# Patient Record
Sex: FEMALE | Race: WHITE | NOT HISPANIC OR LATINO | Employment: FULL TIME | URBAN - METROPOLITAN AREA
[De-identification: names, ages, dates, MRNs, and addresses within clinical notes are randomized per-mention and may not be internally consistent; named-entity substitution may affect disease eponyms.]

---

## 2019-12-05 ENCOUNTER — HOSPITAL ENCOUNTER (INPATIENT)
Facility: HOSPITAL | Age: 59
LOS: 3 days | Discharge: HOME | End: 2019-12-08
Attending: EMERGENCY MEDICINE | Admitting: SURGERY

## 2019-12-05 ENCOUNTER — APPOINTMENT (OUTPATIENT)
Dept: ULTRASOUND IMAGING | Facility: HOSPITAL | Age: 59
End: 2019-12-05

## 2019-12-05 DIAGNOSIS — K80.20 CALCULUS OF GALLBLADDER: ICD-10-CM

## 2019-12-05 DIAGNOSIS — K81.9 CHOLECYSTITIS: ICD-10-CM

## 2019-12-05 DIAGNOSIS — R10.9 ACUTE ABDOMINAL PAIN: ICD-10-CM

## 2019-12-05 DIAGNOSIS — K80.00 CALCULUS OF GALLBLADDER WITH ACUTE CHOLECYSTITIS WITHOUT OBSTRUCTION: Primary | ICD-10-CM

## 2019-12-05 DIAGNOSIS — D72.829 LEUKOCYTOSIS, UNSPECIFIED TYPE: ICD-10-CM

## 2019-12-05 LAB
ALBUMIN SERPL-MCNC: 4.3 G/DL (ref 3.5–5.2)
ALBUMIN/GLOB SERPL: 1 G/DL
ALP SERPL-CCNC: 91 U/L (ref 39–117)
ALT SERPL W P-5'-P-CCNC: 14 U/L (ref 1–33)
ANION GAP SERPL CALCULATED.3IONS-SCNC: 14 MMOL/L (ref 5–15)
AST SERPL-CCNC: 16 U/L (ref 1–32)
BACTERIA UR QL AUTO: NORMAL /HPF
BASOPHILS # BLD AUTO: 0.06 10*3/MM3 (ref 0–0.2)
BASOPHILS NFR BLD AUTO: 0.3 % (ref 0–1.5)
BILIRUB SERPL-MCNC: 0.4 MG/DL (ref 0.2–1.2)
BILIRUB UR QL STRIP: NEGATIVE
BUN BLD-MCNC: 12 MG/DL (ref 8–23)
BUN/CREAT SERPL: 15.2 (ref 7–25)
CALCIUM SPEC-SCNC: 9.7 MG/DL (ref 8.6–10.5)
CHLORIDE SERPL-SCNC: 95 MMOL/L (ref 98–107)
CLARITY UR: CLEAR
CO2 SERPL-SCNC: 29 MMOL/L (ref 22–29)
COLOR UR: YELLOW
CREAT BLD-MCNC: 0.79 MG/DL (ref 0.57–1)
D-LACTATE SERPL-SCNC: 1.2 MMOL/L (ref 0.5–2)
DEPRECATED RDW RBC AUTO: 40.5 FL (ref 37–54)
EOSINOPHIL # BLD AUTO: 0.04 10*3/MM3 (ref 0–0.4)
EOSINOPHIL NFR BLD AUTO: 0.2 % (ref 0.3–6.2)
ERYTHROCYTE [DISTWIDTH] IN BLOOD BY AUTOMATED COUNT: 12.5 % (ref 12.3–15.4)
GFR SERPL CREATININE-BSD FRML MDRD: 74 ML/MIN/1.73
GLOBULIN UR ELPH-MCNC: 4.1 GM/DL
GLUCOSE BLD-MCNC: 131 MG/DL (ref 65–99)
GLUCOSE UR STRIP-MCNC: NEGATIVE MG/DL
HCT VFR BLD AUTO: 45.1 % (ref 34–46.6)
HGB BLD-MCNC: 14.7 G/DL (ref 12–15.9)
HGB UR QL STRIP.AUTO: NEGATIVE
HYALINE CASTS UR QL AUTO: NORMAL /LPF
IMM GRANULOCYTES # BLD AUTO: 0.06 10*3/MM3 (ref 0–0.05)
IMM GRANULOCYTES NFR BLD AUTO: 0.3 % (ref 0–0.5)
KETONES UR QL STRIP: NEGATIVE
LEUKOCYTE ESTERASE UR QL STRIP.AUTO: NEGATIVE
LIPASE SERPL-CCNC: 170 U/L (ref 13–60)
LYMPHOCYTES # BLD AUTO: 3.49 10*3/MM3 (ref 0.7–3.1)
LYMPHOCYTES NFR BLD AUTO: 20 % (ref 19.6–45.3)
MCH RBC QN AUTO: 28.9 PG (ref 26.6–33)
MCHC RBC AUTO-ENTMCNC: 32.6 G/DL (ref 31.5–35.7)
MCV RBC AUTO: 88.6 FL (ref 79–97)
MONOCYTES # BLD AUTO: 1.39 10*3/MM3 (ref 0.1–0.9)
MONOCYTES NFR BLD AUTO: 8 % (ref 5–12)
NEUTROPHILS # BLD AUTO: 12.43 10*3/MM3 (ref 1.7–7)
NEUTROPHILS NFR BLD AUTO: 71.2 % (ref 42.7–76)
NITRITE UR QL STRIP: NEGATIVE
NRBC BLD AUTO-RTO: 0 /100 WBC (ref 0–0.2)
PH UR STRIP.AUTO: 6.5 [PH] (ref 5–8)
PLATELET # BLD AUTO: 408 10*3/MM3 (ref 140–450)
PMV BLD AUTO: 10.4 FL (ref 6–12)
POTASSIUM BLD-SCNC: 3.5 MMOL/L (ref 3.5–5.2)
PROCALCITONIN SERPL-MCNC: 0.1 NG/ML (ref 0.1–0.25)
PROT SERPL-MCNC: 8.4 G/DL (ref 6–8.5)
PROT UR QL STRIP: ABNORMAL
RBC # BLD AUTO: 5.09 10*6/MM3 (ref 3.77–5.28)
RBC # UR: NORMAL /HPF
REF LAB TEST METHOD: NORMAL
SODIUM BLD-SCNC: 138 MMOL/L (ref 136–145)
SP GR UR STRIP: 1.04 (ref 1–1.03)
SQUAMOUS #/AREA URNS HPF: NORMAL /HPF
TROPONIN T SERPL-MCNC: <0.01 NG/ML (ref 0–0.03)
UROBILINOGEN UR QL STRIP: ABNORMAL
WBC NRBC COR # BLD: 17.47 10*3/MM3 (ref 3.4–10.8)
WBC UR QL AUTO: NORMAL /HPF

## 2019-12-05 PROCEDURE — 25010000002 PIPERACILLIN SOD-TAZOBACTAM PER 1 G: Performed by: NURSE PRACTITIONER

## 2019-12-05 PROCEDURE — 76705 ECHO EXAM OF ABDOMEN: CPT

## 2019-12-05 PROCEDURE — 99284 EMERGENCY DEPT VISIT MOD MDM: CPT

## 2019-12-05 PROCEDURE — 25010000002 HYDROMORPHONE PER 4 MG: Performed by: EMERGENCY MEDICINE

## 2019-12-05 PROCEDURE — 86901 BLOOD TYPING SEROLOGIC RH(D): CPT

## 2019-12-05 PROCEDURE — 93005 ELECTROCARDIOGRAM TRACING: CPT | Performed by: EMERGENCY MEDICINE

## 2019-12-05 PROCEDURE — 84145 PROCALCITONIN (PCT): CPT | Performed by: FAMILY MEDICINE

## 2019-12-05 PROCEDURE — 83605 ASSAY OF LACTIC ACID: CPT | Performed by: EMERGENCY MEDICINE

## 2019-12-05 PROCEDURE — 86900 BLOOD TYPING SEROLOGIC ABO: CPT

## 2019-12-05 PROCEDURE — 84484 ASSAY OF TROPONIN QUANT: CPT | Performed by: EMERGENCY MEDICINE

## 2019-12-05 PROCEDURE — 87040 BLOOD CULTURE FOR BACTERIA: CPT | Performed by: EMERGENCY MEDICINE

## 2019-12-05 PROCEDURE — 83690 ASSAY OF LIPASE: CPT | Performed by: EMERGENCY MEDICINE

## 2019-12-05 PROCEDURE — 85025 COMPLETE CBC W/AUTO DIFF WBC: CPT | Performed by: EMERGENCY MEDICINE

## 2019-12-05 PROCEDURE — 25010000002 ONDANSETRON PER 1 MG: Performed by: EMERGENCY MEDICINE

## 2019-12-05 PROCEDURE — 80053 COMPREHEN METABOLIC PANEL: CPT | Performed by: EMERGENCY MEDICINE

## 2019-12-05 PROCEDURE — 81001 URINALYSIS AUTO W/SCOPE: CPT | Performed by: EMERGENCY MEDICINE

## 2019-12-05 RX ORDER — HYDROMORPHONE HYDROCHLORIDE 1 MG/ML
0.5 INJECTION, SOLUTION INTRAMUSCULAR; INTRAVENOUS; SUBCUTANEOUS ONCE
Status: COMPLETED | OUTPATIENT
Start: 2019-12-05 | End: 2019-12-05

## 2019-12-05 RX ORDER — LISINOPRIL 10 MG/1
10 TABLET ORAL DAILY PRN
COMMUNITY

## 2019-12-05 RX ORDER — ONDANSETRON 2 MG/ML
4 INJECTION INTRAMUSCULAR; INTRAVENOUS ONCE
Status: COMPLETED | OUTPATIENT
Start: 2019-12-05 | End: 2019-12-05

## 2019-12-05 RX ORDER — HYDROCHLOROTHIAZIDE 12.5 MG/1
12.5 TABLET ORAL DAILY PRN
COMMUNITY

## 2019-12-05 RX ORDER — SODIUM CHLORIDE 0.9 % (FLUSH) 0.9 %
10 SYRINGE (ML) INJECTION AS NEEDED
Status: DISCONTINUED | OUTPATIENT
Start: 2019-12-05 | End: 2019-12-08 | Stop reason: HOSPADM

## 2019-12-05 RX ADMIN — TAZOBACTAM SODIUM AND PIPERACILLIN SODIUM 3.38 G: 375; 3 INJECTION, SOLUTION INTRAVENOUS at 21:38

## 2019-12-05 RX ADMIN — ONDANSETRON 4 MG: 2 INJECTION INTRAMUSCULAR; INTRAVENOUS at 21:23

## 2019-12-05 RX ADMIN — HYDROMORPHONE HYDROCHLORIDE 0.5 MG: 1 INJECTION, SOLUTION INTRAMUSCULAR; INTRAVENOUS; SUBCUTANEOUS at 21:24

## 2019-12-05 RX ADMIN — SODIUM CHLORIDE 1000 ML: 9 INJECTION, SOLUTION INTRAVENOUS at 21:23

## 2019-12-06 ENCOUNTER — APPOINTMENT (OUTPATIENT)
Dept: GENERAL RADIOLOGY | Facility: HOSPITAL | Age: 59
End: 2019-12-06

## 2019-12-06 ENCOUNTER — ANESTHESIA (OUTPATIENT)
Dept: PERIOP | Facility: HOSPITAL | Age: 59
End: 2019-12-06

## 2019-12-06 ENCOUNTER — APPOINTMENT (OUTPATIENT)
Dept: CT IMAGING | Facility: HOSPITAL | Age: 59
End: 2019-12-06

## 2019-12-06 ENCOUNTER — ANESTHESIA EVENT (OUTPATIENT)
Dept: PERIOP | Facility: HOSPITAL | Age: 59
End: 2019-12-06

## 2019-12-06 PROBLEM — K85.90 PANCREATITIS: Status: ACTIVE | Noted: 2019-12-06

## 2019-12-06 PROBLEM — I10 HTN (HYPERTENSION): Status: ACTIVE | Noted: 2019-12-06

## 2019-12-06 PROBLEM — E27.8 ADRENAL NODULE (HCC): Status: ACTIVE | Noted: 2019-12-06

## 2019-12-06 PROBLEM — D72.829 LEUKOCYTOSIS: Status: ACTIVE | Noted: 2019-12-06

## 2019-12-06 LAB
ABO GROUP BLD: NORMAL
ABO GROUP BLD: NORMAL
ANION GAP SERPL CALCULATED.3IONS-SCNC: 11 MMOL/L (ref 5–15)
APTT PPP: 31.4 SECONDS (ref 24–37)
BASOPHILS # BLD AUTO: 0.04 10*3/MM3 (ref 0–0.2)
BASOPHILS NFR BLD AUTO: 0.3 % (ref 0–1.5)
BLD GP AB SCN SERPL QL: NEGATIVE
BUN BLD-MCNC: 11 MG/DL (ref 8–23)
BUN/CREAT SERPL: 16.4 (ref 7–25)
CALCIUM SPEC-SCNC: 8.6 MG/DL (ref 8.6–10.5)
CHLORIDE SERPL-SCNC: 97 MMOL/L (ref 98–107)
CO2 SERPL-SCNC: 29 MMOL/L (ref 22–29)
CREAT BLD-MCNC: 0.67 MG/DL (ref 0.57–1)
DEPRECATED RDW RBC AUTO: 42.1 FL (ref 37–54)
EOSINOPHIL # BLD AUTO: 0.13 10*3/MM3 (ref 0–0.4)
EOSINOPHIL NFR BLD AUTO: 1 % (ref 0.3–6.2)
ERYTHROCYTE [DISTWIDTH] IN BLOOD BY AUTOMATED COUNT: 12.8 % (ref 12.3–15.4)
GFR SERPL CREATININE-BSD FRML MDRD: 90 ML/MIN/1.73
GLUCOSE BLD-MCNC: 101 MG/DL (ref 65–99)
HBA1C MFR BLD: 5.6 % (ref 4.8–5.6)
HCT VFR BLD AUTO: 39.7 % (ref 34–46.6)
HGB BLD-MCNC: 12.5 G/DL (ref 12–15.9)
IMM GRANULOCYTES # BLD AUTO: 0.04 10*3/MM3 (ref 0–0.05)
IMM GRANULOCYTES NFR BLD AUTO: 0.3 % (ref 0–0.5)
INR PPP: 1.07 (ref 0.85–1.16)
LYMPHOCYTES # BLD AUTO: 2.72 10*3/MM3 (ref 0.7–3.1)
LYMPHOCYTES NFR BLD AUTO: 21.4 % (ref 19.6–45.3)
MAGNESIUM SERPL-MCNC: 1.9 MG/DL (ref 1.6–2.4)
MCH RBC QN AUTO: 28.1 PG (ref 26.6–33)
MCHC RBC AUTO-ENTMCNC: 31.5 G/DL (ref 31.5–35.7)
MCV RBC AUTO: 89.2 FL (ref 79–97)
MONOCYTES # BLD AUTO: 1.36 10*3/MM3 (ref 0.1–0.9)
MONOCYTES NFR BLD AUTO: 10.7 % (ref 5–12)
NEUTROPHILS # BLD AUTO: 8.42 10*3/MM3 (ref 1.7–7)
NEUTROPHILS NFR BLD AUTO: 66.3 % (ref 42.7–76)
NRBC BLD AUTO-RTO: 0 /100 WBC (ref 0–0.2)
PLATELET # BLD AUTO: 339 10*3/MM3 (ref 140–450)
PMV BLD AUTO: 11.3 FL (ref 6–12)
POTASSIUM BLD-SCNC: 3.1 MMOL/L (ref 3.5–5.2)
PROTHROMBIN TIME: 13.3 SECONDS (ref 11.2–14.3)
RBC # BLD AUTO: 4.45 10*6/MM3 (ref 3.77–5.28)
RH BLD: POSITIVE
RH BLD: POSITIVE
SODIUM BLD-SCNC: 137 MMOL/L (ref 136–145)
T&S EXPIRATION DATE: NORMAL
WBC NRBC COR # BLD: 12.71 10*3/MM3 (ref 3.4–10.8)

## 2019-12-06 PROCEDURE — 0FT44ZZ RESECTION OF GALLBLADDER, PERCUTANEOUS ENDOSCOPIC APPROACH: ICD-10-PCS | Performed by: SURGERY

## 2019-12-06 PROCEDURE — 88304 TISSUE EXAM BY PATHOLOGIST: CPT | Performed by: SURGERY

## 2019-12-06 PROCEDURE — 86900 BLOOD TYPING SEROLOGIC ABO: CPT | Performed by: NURSE PRACTITIONER

## 2019-12-06 PROCEDURE — 25010000002 PROPOFOL 10 MG/ML EMULSION: Performed by: NURSE ANESTHETIST, CERTIFIED REGISTERED

## 2019-12-06 PROCEDURE — 85730 THROMBOPLASTIN TIME PARTIAL: CPT | Performed by: NURSE PRACTITIONER

## 2019-12-06 PROCEDURE — 83036 HEMOGLOBIN GLYCOSYLATED A1C: CPT | Performed by: NURSE PRACTITIONER

## 2019-12-06 PROCEDURE — 71045 X-RAY EXAM CHEST 1 VIEW: CPT

## 2019-12-06 PROCEDURE — 25010000003 LIDOCAINE 1 % SOLUTION: Performed by: NURSE ANESTHETIST, CERTIFIED REGISTERED

## 2019-12-06 PROCEDURE — 85610 PROTHROMBIN TIME: CPT | Performed by: NURSE PRACTITIONER

## 2019-12-06 PROCEDURE — 25010000002 ONDANSETRON PER 1 MG: Performed by: NURSE ANESTHETIST, CERTIFIED REGISTERED

## 2019-12-06 PROCEDURE — 74177 CT ABD & PELVIS W/CONTRAST: CPT

## 2019-12-06 PROCEDURE — 25010000002 FENTANYL CITRATE (PF) 100 MCG/2ML SOLUTION: Performed by: NURSE ANESTHETIST, CERTIFIED REGISTERED

## 2019-12-06 PROCEDURE — 25010000002 HYDROMORPHONE PER 4 MG: Performed by: NURSE PRACTITIONER

## 2019-12-06 PROCEDURE — 25010000002 DEXAMETHASONE PER 1 MG: Performed by: NURSE ANESTHETIST, CERTIFIED REGISTERED

## 2019-12-06 PROCEDURE — 85025 COMPLETE CBC W/AUTO DIFF WBC: CPT | Performed by: NURSE PRACTITIONER

## 2019-12-06 PROCEDURE — 25010000002 NEOSTIGMINE 10 MG/10ML SOLUTION: Performed by: NURSE ANESTHETIST, CERTIFIED REGISTERED

## 2019-12-06 PROCEDURE — 25010000002 PIPERACILLIN SOD-TAZOBACTAM PER 1 G: Performed by: NURSE PRACTITIONER

## 2019-12-06 PROCEDURE — 86850 RBC ANTIBODY SCREEN: CPT | Performed by: NURSE PRACTITIONER

## 2019-12-06 PROCEDURE — 86901 BLOOD TYPING SEROLOGIC RH(D): CPT | Performed by: NURSE PRACTITIONER

## 2019-12-06 PROCEDURE — 25010000002 IOPAMIDOL 61 % SOLUTION: Performed by: FAMILY MEDICINE

## 2019-12-06 PROCEDURE — 80048 BASIC METABOLIC PNL TOTAL CA: CPT | Performed by: NURSE PRACTITIONER

## 2019-12-06 PROCEDURE — 83735 ASSAY OF MAGNESIUM: CPT | Performed by: NURSE PRACTITIONER

## 2019-12-06 PROCEDURE — 25010000002 SUCCINYLCHOLINE PER 20 MG: Performed by: NURSE ANESTHETIST, CERTIFIED REGISTERED

## 2019-12-06 PROCEDURE — 99223 1ST HOSP IP/OBS HIGH 75: CPT | Performed by: FAMILY MEDICINE

## 2019-12-06 RX ORDER — SODIUM CHLORIDE 0.9 % (FLUSH) 0.9 %
10 SYRINGE (ML) INJECTION EVERY 12 HOURS SCHEDULED
Status: DISCONTINUED | OUTPATIENT
Start: 2019-12-06 | End: 2019-12-08 | Stop reason: HOSPADM

## 2019-12-06 RX ORDER — POTASSIUM CHLORIDE 7.45 MG/ML
10 INJECTION INTRAVENOUS
Status: DISCONTINUED | OUTPATIENT
Start: 2019-12-06 | End: 2019-12-08 | Stop reason: HOSPADM

## 2019-12-06 RX ORDER — SODIUM CHLORIDE 9 MG/ML
INJECTION, SOLUTION INTRAVENOUS AS NEEDED
Status: DISCONTINUED | OUTPATIENT
Start: 2019-12-06 | End: 2019-12-06 | Stop reason: HOSPADM

## 2019-12-06 RX ORDER — FENTANYL CITRATE 50 UG/ML
INJECTION, SOLUTION INTRAMUSCULAR; INTRAVENOUS AS NEEDED
Status: DISCONTINUED | OUTPATIENT
Start: 2019-12-06 | End: 2019-12-06 | Stop reason: SURG

## 2019-12-06 RX ORDER — SODIUM CHLORIDE 0.9 % (FLUSH) 0.9 %
10 SYRINGE (ML) INJECTION AS NEEDED
Status: DISCONTINUED | OUTPATIENT
Start: 2019-12-06 | End: 2019-12-08 | Stop reason: HOSPADM

## 2019-12-06 RX ORDER — ATRACURIUM BESYLATE 10 MG/ML
INJECTION, SOLUTION INTRAVENOUS AS NEEDED
Status: DISCONTINUED | OUTPATIENT
Start: 2019-12-06 | End: 2019-12-06 | Stop reason: SURG

## 2019-12-06 RX ORDER — SODIUM CHLORIDE 9 MG/ML
50 INJECTION, SOLUTION INTRAVENOUS CONTINUOUS
Status: DISCONTINUED | OUTPATIENT
Start: 2019-12-06 | End: 2019-12-08 | Stop reason: HOSPADM

## 2019-12-06 RX ORDER — SODIUM CHLORIDE, SODIUM LACTATE, POTASSIUM CHLORIDE, CALCIUM CHLORIDE 600; 310; 30; 20 MG/100ML; MG/100ML; MG/100ML; MG/100ML
9 INJECTION, SOLUTION INTRAVENOUS CONTINUOUS
Status: DISCONTINUED | OUTPATIENT
Start: 2019-12-06 | End: 2019-12-08 | Stop reason: HOSPADM

## 2019-12-06 RX ORDER — SODIUM CHLORIDE, SODIUM LACTATE, POTASSIUM CHLORIDE, CALCIUM CHLORIDE 600; 310; 30; 20 MG/100ML; MG/100ML; MG/100ML; MG/100ML
INJECTION, SOLUTION INTRAVENOUS CONTINUOUS PRN
Status: DISCONTINUED | OUTPATIENT
Start: 2019-12-06 | End: 2019-12-06 | Stop reason: SURG

## 2019-12-06 RX ORDER — NEOSTIGMINE METHYLSULFATE 1 MG/ML
INJECTION, SOLUTION INTRAVENOUS AS NEEDED
Status: DISCONTINUED | OUTPATIENT
Start: 2019-12-06 | End: 2019-12-06 | Stop reason: SURG

## 2019-12-06 RX ORDER — POTASSIUM CHLORIDE 1.5 G/1.77G
40 POWDER, FOR SOLUTION ORAL AS NEEDED
Status: DISCONTINUED | OUTPATIENT
Start: 2019-12-06 | End: 2019-12-08 | Stop reason: HOSPADM

## 2019-12-06 RX ORDER — ONDANSETRON 2 MG/ML
4 INJECTION INTRAMUSCULAR; INTRAVENOUS EVERY 6 HOURS PRN
Status: DISCONTINUED | OUTPATIENT
Start: 2019-12-06 | End: 2019-12-06

## 2019-12-06 RX ORDER — ONDANSETRON 2 MG/ML
INJECTION INTRAMUSCULAR; INTRAVENOUS AS NEEDED
Status: DISCONTINUED | OUTPATIENT
Start: 2019-12-06 | End: 2019-12-06

## 2019-12-06 RX ORDER — ONDANSETRON 2 MG/ML
4 INJECTION INTRAMUSCULAR; INTRAVENOUS ONCE AS NEEDED
Status: DISCONTINUED | OUTPATIENT
Start: 2019-12-06 | End: 2019-12-06 | Stop reason: HOSPADM

## 2019-12-06 RX ORDER — ONDANSETRON 4 MG/1
4 TABLET, FILM COATED ORAL EVERY 6 HOURS PRN
Status: DISCONTINUED | OUTPATIENT
Start: 2019-12-06 | End: 2019-12-08 | Stop reason: HOSPADM

## 2019-12-06 RX ORDER — BUPIVACAINE HYDROCHLORIDE AND EPINEPHRINE 5; 5 MG/ML; UG/ML
INJECTION, SOLUTION PERINEURAL AS NEEDED
Status: DISCONTINUED | OUTPATIENT
Start: 2019-12-06 | End: 2019-12-06 | Stop reason: HOSPADM

## 2019-12-06 RX ORDER — DEXAMETHASONE SODIUM PHOSPHATE 4 MG/ML
INJECTION, SOLUTION INTRA-ARTICULAR; INTRALESIONAL; INTRAMUSCULAR; INTRAVENOUS; SOFT TISSUE AS NEEDED
Status: DISCONTINUED | OUTPATIENT
Start: 2019-12-06 | End: 2019-12-06 | Stop reason: SURG

## 2019-12-06 RX ORDER — HYDROCODONE BITARTRATE AND ACETAMINOPHEN 5; 325 MG/1; MG/1
1 TABLET ORAL EVERY 6 HOURS PRN
Status: DISCONTINUED | OUTPATIENT
Start: 2019-12-06 | End: 2019-12-08 | Stop reason: HOSPADM

## 2019-12-06 RX ORDER — SODIUM CHLORIDE 9 MG/ML
100 INJECTION, SOLUTION INTRAVENOUS CONTINUOUS
Status: DISCONTINUED | OUTPATIENT
Start: 2019-12-06 | End: 2019-12-06

## 2019-12-06 RX ORDER — POTASSIUM CHLORIDE 750 MG/1
40 CAPSULE, EXTENDED RELEASE ORAL AS NEEDED
Status: DISCONTINUED | OUTPATIENT
Start: 2019-12-06 | End: 2019-12-08 | Stop reason: HOSPADM

## 2019-12-06 RX ORDER — MAGNESIUM HYDROXIDE 1200 MG/15ML
LIQUID ORAL AS NEEDED
Status: DISCONTINUED | OUTPATIENT
Start: 2019-12-06 | End: 2019-12-06 | Stop reason: HOSPADM

## 2019-12-06 RX ORDER — HYDROMORPHONE HYDROCHLORIDE 1 MG/ML
0.5 INJECTION, SOLUTION INTRAMUSCULAR; INTRAVENOUS; SUBCUTANEOUS
Status: DISCONTINUED | OUTPATIENT
Start: 2019-12-06 | End: 2019-12-08 | Stop reason: HOSPADM

## 2019-12-06 RX ORDER — FENTANYL CITRATE 50 UG/ML
50 INJECTION, SOLUTION INTRAMUSCULAR; INTRAVENOUS
Status: DISCONTINUED | OUTPATIENT
Start: 2019-12-06 | End: 2019-12-06 | Stop reason: HOSPADM

## 2019-12-06 RX ORDER — PROPOFOL 10 MG/ML
VIAL (ML) INTRAVENOUS AS NEEDED
Status: DISCONTINUED | OUTPATIENT
Start: 2019-12-06 | End: 2019-12-06 | Stop reason: SURG

## 2019-12-06 RX ORDER — LIDOCAINE HYDROCHLORIDE 10 MG/ML
INJECTION, SOLUTION INFILTRATION; PERINEURAL AS NEEDED
Status: DISCONTINUED | OUTPATIENT
Start: 2019-12-06 | End: 2019-12-06 | Stop reason: SURG

## 2019-12-06 RX ORDER — FAMOTIDINE 20 MG/1
20 TABLET, FILM COATED ORAL ONCE
Status: COMPLETED | OUTPATIENT
Start: 2019-12-06 | End: 2019-12-06

## 2019-12-06 RX ORDER — SUCCINYLCHOLINE CHLORIDE 20 MG/ML
INJECTION INTRAMUSCULAR; INTRAVENOUS AS NEEDED
Status: DISCONTINUED | OUTPATIENT
Start: 2019-12-06 | End: 2019-12-06 | Stop reason: SURG

## 2019-12-06 RX ORDER — GLYCOPYRROLATE 0.2 MG/ML
INJECTION INTRAMUSCULAR; INTRAVENOUS AS NEEDED
Status: DISCONTINUED | OUTPATIENT
Start: 2019-12-06 | End: 2019-12-06 | Stop reason: SURG

## 2019-12-06 RX ADMIN — POTASSIUM CHLORIDE 40 MEQ: 750 CAPSULE, EXTENDED RELEASE ORAL at 17:20

## 2019-12-06 RX ADMIN — SODIUM CHLORIDE 100 ML/HR: 9 INJECTION, SOLUTION INTRAVENOUS at 03:55

## 2019-12-06 RX ADMIN — ONDANSETRON 4 MG: 2 INJECTION INTRAMUSCULAR; INTRAVENOUS at 15:15

## 2019-12-06 RX ADMIN — POTASSIUM CHLORIDE 40 MEQ: 750 CAPSULE, EXTENDED RELEASE ORAL at 21:22

## 2019-12-06 RX ADMIN — HYDROMORPHONE HYDROCHLORIDE 0.5 MG: 1 INJECTION, SOLUTION INTRAMUSCULAR; INTRAVENOUS; SUBCUTANEOUS at 21:22

## 2019-12-06 RX ADMIN — LIDOCAINE HYDROCHLORIDE 50 MG: 10 INJECTION, SOLUTION INFILTRATION; PERINEURAL at 15:06

## 2019-12-06 RX ADMIN — IOPAMIDOL 85 ML: 612 INJECTION, SOLUTION INTRAVENOUS at 07:00

## 2019-12-06 RX ADMIN — TAZOBACTAM SODIUM AND PIPERACILLIN SODIUM 3.38 G: 375; 3 INJECTION, SOLUTION INTRAVENOUS at 03:55

## 2019-12-06 RX ADMIN — SUCCINYLCHOLINE CHLORIDE 100 MG: 20 INJECTION, SOLUTION INTRAMUSCULAR; INTRAVENOUS; PARENTERAL at 15:06

## 2019-12-06 RX ADMIN — FENTANYL CITRATE 50 MCG: 50 INJECTION, SOLUTION INTRAMUSCULAR; INTRAVENOUS at 15:06

## 2019-12-06 RX ADMIN — TAZOBACTAM SODIUM AND PIPERACILLIN SODIUM 3.38 G: 375; 3 INJECTION, SOLUTION INTRAVENOUS at 20:38

## 2019-12-06 RX ADMIN — PROPOFOL 150 MG: 10 INJECTION, EMULSION INTRAVENOUS at 15:06

## 2019-12-06 RX ADMIN — POTASSIUM CHLORIDE 40 MEQ: 750 CAPSULE, EXTENDED RELEASE ORAL at 11:10

## 2019-12-06 RX ADMIN — SODIUM CHLORIDE, POTASSIUM CHLORIDE, SODIUM LACTATE AND CALCIUM CHLORIDE 9 ML/HR: 600; 310; 30; 20 INJECTION, SOLUTION INTRAVENOUS at 14:40

## 2019-12-06 RX ADMIN — NEOSTIGMINE METHYLSULFATE 3 MG: 1 INJECTION, SOLUTION INTRAVENOUS at 15:53

## 2019-12-06 RX ADMIN — SODIUM CHLORIDE 50 ML/HR: 9 INJECTION, SOLUTION INTRAVENOUS at 17:20

## 2019-12-06 RX ADMIN — HYDROCODONE BITARTRATE AND ACETAMINOPHEN 1 TABLET: 5; 325 TABLET ORAL at 17:20

## 2019-12-06 RX ADMIN — FAMOTIDINE 20 MG: 20 TABLET ORAL at 14:47

## 2019-12-06 RX ADMIN — HYDROMORPHONE HYDROCHLORIDE 0.5 MG: 1 INJECTION, SOLUTION INTRAMUSCULAR; INTRAVENOUS; SUBCUTANEOUS at 03:56

## 2019-12-06 RX ADMIN — ATRACURIUM BESYLATE 10 MG: 10 INJECTION, SOLUTION INTRAVENOUS at 15:06

## 2019-12-06 RX ADMIN — TAZOBACTAM SODIUM AND PIPERACILLIN SODIUM 3.38 G: 375; 3 INJECTION, SOLUTION INTRAVENOUS at 11:10

## 2019-12-06 RX ADMIN — DEXAMETHASONE SODIUM PHOSPHATE 4 MG: 4 INJECTION, SOLUTION INTRAMUSCULAR; INTRAVENOUS at 15:13

## 2019-12-06 RX ADMIN — GLYCOPYRROLATE 0.4 MG: 0.2 INJECTION, SOLUTION INTRAMUSCULAR; INTRAVENOUS at 15:53

## 2019-12-06 RX ADMIN — SODIUM CHLORIDE, POTASSIUM CHLORIDE, SODIUM LACTATE AND CALCIUM CHLORIDE: 600; 310; 30; 20 INJECTION, SOLUTION INTRAVENOUS at 15:02

## 2019-12-06 RX ADMIN — FENTANYL CITRATE 50 MCG: 50 INJECTION, SOLUTION INTRAMUSCULAR; INTRAVENOUS at 15:55

## 2019-12-06 NOTE — ANESTHESIA PREPROCEDURE EVALUATION
Anesthesia Evaluation     NPO Solid Status: > 8 hours  NPO Liquid Status: > 6 hours           Airway   Mallampati: II  TM distance: >3 FB  Neck ROM: full  No difficulty expected  Dental - normal exam     Pulmonary    (+) a smoker Former, decreased breath sounds,   (-) asthma  Cardiovascular     ECG reviewed  Rhythm: regular  Rate: normal    (-) pacemaker, angina, murmur, cardiac stents, CABG      Neuro/Psych  (-) seizures, TIA  GI/Hepatic/Renal/Endo    (-) liver disease, no renal disease, diabetes    Musculoskeletal     Abdominal    Substance History      OB/GYN          Other                        Anesthesia Plan    ASA 2     general   Rapid sequence(GA  RSI)  intravenous induction       Plan discussed with CRNA.

## 2019-12-06 NOTE — PROGRESS NOTES
Discharge Planning Assessment  [facility]     Patient Name: Yaritza Barney  MRN: [id number]  Today's Date: 12/6/2019    Admit Date: 12/5/2019    Discharge Needs Assessment     Row Name 12/06/19 1508       Living Environment    Lives With  spouse;child(ozzy), dependent 2-y.o. grand-nephew    Current Living Arrangements  home/apartment/condo    Primary Care Provided by  self    Provides Primary Care For  child(ozzy)    Family Caregiver if Needed  spouse    Quality of Family Relationships  helpful;involved;supportive    Able to Return to Prior Arrangements  yes       Resource/Environmental Concerns    Resource/Environmental Concerns  none       Transition Planning    Patient/Family Anticipates Transition to  home with family    Patient/Family Anticipated Services at Transition  none    Transportation Anticipated  family or friend will provide       Discharge Needs Assessment    Readmission Within the Last 30 Days  no previous admission in last 30 days    Concerns to be Addressed  no discharge needs identified;denies needs/concerns at this time    Equipment Currently Used at Home  none        Discharge Plan     Row Name 12/06/19 1509       Plan    Plan  Home    Patient/Family in Agreement with Plan  yes    Plan Comments  Met with patient and her  to initiate discharge planning. Patient lives with her  and 2-y.o. grand-nephew in a home in [facility]. She is independent with ADLs and mobility and does not use any DME. Her goal is home at discharge, and family will provide her ride. Patient does not anticipate any discharge needs at this time. CM will continue to follow.     Final Discharge Disposition Code  01 - home or self-care        Destination      No service coordination in this encounter.      Durable Medical Equipment      No service coordination in this encounter.      Dialysis/Infusion      No service coordination in this encounter.      Home Medical Care      No service coordination in this  encounter.      Therapy      No service coordination in this encounter.      Community Resources      No service coordination in this encounter.        Expected Discharge Date and Time     Expected Discharge Date Expected Discharge Time    Dec 9, 2019         Demographic Summary     Row Name 12/06/19 1507       General Information    Admission Type  inpatient    Referral Source  admission list    Reason for Consult  discharge planning    General Information Comments  PCP is Diana Claudio       Contact Information    Permission Granted to Share Info With  ;family/designee , Ector Villatoro        Functional Status     Row Name 12/06/19 1508       Functional Status    Usual Activity Tolerance  good       Functional Status, IADL    Medications  independent    Meal Preparation  independent    Housekeeping  independent    Laundry  independent    Shopping  independent       Employment/    Employment/ Comments  Confirmed with patient that she has medical and rx coverage through [employer].        Psychosocial    No documentation.       Abuse/Neglect    No documentation.       Legal    No documentation.       Substance Abuse    No documentation.       Patient Forms    No documentation.           Ruth Bacon RN

## 2019-12-06 NOTE — ED PROVIDER NOTES
Subjective   Ms. Yaritza Barney is a 59 y.o female presenting to the emergency department with complaints of abdominal pain. She has had intermittent right upper quadrant pain for the past month. Starting last week, the intermittent pain began to worsen and she had nausea and vomiting. The pain became severe last night and it is worsened by eating. She saw her primary care physician today who ordered a CT scan and sent her here because the gallbladder appears infected. She confirms a history of a hysterectomy and denies any other abdominal surgeries. There are no other acute symptoms at this time.        History provided by:  Patient  Abdominal Pain   Pain location:  RUQ  Pain radiates to:  Does not radiate  Pain severity:  Severe  Onset quality:  Gradual  Duration: 1 month.  Timing:  Intermittent  Progression:  Worsening  Chronicity:  New  Relieved by:  None tried  Worsened by:  Eating  Ineffective treatments:  None tried  Associated symptoms: nausea and vomiting        Review of Systems   Gastrointestinal: Positive for abdominal pain, nausea and vomiting.   All other systems reviewed and are negative.      Past Medical History:   Diagnosis Date   • Hypertension        No Known Allergies    Past Surgical History:   Procedure Laterality Date   • HYSTERECTOMY         History reviewed. No pertinent family history.    Social History     Socioeconomic History   • Marital status:      Spouse name: Not on file   • Number of children: Not on file   • Years of education: Not on file   • Highest education level: Not on file   Tobacco Use   • Smoking status: Never Smoker   • Smokeless tobacco: Never Used   Substance and Sexual Activity   • Alcohol use: No     Frequency: Never   • Drug use: No         Objective   Physical Exam   Constitutional: She is oriented to person, place, and time. She appears well-developed and well-nourished.   HENT:   Head: Normocephalic and atraumatic.   Eyes: Conjunctivae are normal. No scleral  icterus.   Neck: Normal range of motion.   Cardiovascular: Normal rate, regular rhythm and normal heart sounds.   Pulmonary/Chest: Effort normal and breath sounds normal. No respiratory distress. She has no wheezes.   Abdominal: Soft. There is tenderness in the right upper quadrant.   Neurological: She is alert and oriented to person, place, and time.   Skin: Skin is warm and dry. She is not diaphoretic.   Psychiatric: She has a normal mood and affect. Her behavior is normal.   Nursing note and vitals reviewed.      Procedures         ED Course  ED Course as of Dec 05 2321   Thu Dec 05, 2019   2118 Her gallbladder consists of stones and is thickened.  Still awaiting blood work.  Consistent with cholecystitis will most likely admit to the hospital.  We are unable to review her CAT scan as she had this done at [facility] as well as all her lab work today.  []   2247 Spoke with Dr. Patel who will admit.  [JM]      ED Course User Index  [JM] Tevin Zelaya APRN       Recent Results (from the past 24 hour(s))   Urinalysis With Microscopic If Indicated (No Culture) - Urine, Clean Catch    Collection Time: 12/05/19  9:13 PM   Result Value Ref Range    Color, UA Yellow Yellow, Straw    Appearance, UA Clear Clear    pH, UA 6.5 5.0 - 8.0    Specific Gravity, UA 1.040 (H) 1.001 - 1.030    Glucose, UA Negative Negative    Ketones, UA Negative Negative    Bilirubin, UA Negative Negative    Blood, UA Negative Negative    Protein, UA 30 mg/dL (1+) (A) Negative    Leuk Esterase, UA Negative Negative    Nitrite, UA Negative Negative    Urobilinogen, UA 0.2 E.U./dL 0.2 - 1.0 E.U./dL   Urinalysis, Microscopic Only - Urine, Clean Catch    Collection Time: 12/05/19  9:13 PM   Result Value Ref Range    RBC, UA 0-2 None Seen, 0-2 /HPF    WBC, UA 0-2 None Seen, 0-2 /HPF    Bacteria, UA None Seen None Seen, Trace /HPF    Squamous Epithelial Cells, UA 0-2 None Seen, 0-2 /HPF    Hyaline Casts, UA None Seen 0 - 6 /LPF    Methodology  Automated Microscopy    Comprehensive Metabolic Panel    Collection Time: 12/05/19  9:22 PM   Result Value Ref Range    Glucose 131 (H) 65 - 99 mg/dL    BUN 12 8 - 23 mg/dL    Creatinine 0.79 0.57 - 1.00 mg/dL    Sodium 138 136 - 145 mmol/L    Potassium 3.5 3.5 - 5.2 mmol/L    Chloride 95 (L) 98 - 107 mmol/L    CO2 29.0 22.0 - 29.0 mmol/L    Calcium 9.7 8.6 - 10.5 mg/dL    Total Protein 8.4 6.0 - 8.5 g/dL    Albumin 4.30 3.50 - 5.20 g/dL    ALT (SGPT) 14 1 - 33 U/L    AST (SGOT) 16 1 - 32 U/L    Alkaline Phosphatase 91 39 - 117 U/L    Total Bilirubin 0.4 0.2 - 1.2 mg/dL    eGFR Non African Amer 74 >60 mL/min/1.73    Globulin 4.1 gm/dL    A/G Ratio 1.0 g/dL    BUN/Creatinine Ratio 15.2 7.0 - 25.0    Anion Gap 14.0 5.0 - 15.0 mmol/L   Troponin    Collection Time: 12/05/19  9:22 PM   Result Value Ref Range    Troponin T <0.010 0.000 - 0.030 ng/mL   Lactic Acid, Plasma    Collection Time: 12/05/19  9:22 PM   Result Value Ref Range    Lactate 1.2 0.5 - 2.0 mmol/L   Lipase    Collection Time: 12/05/19  9:22 PM   Result Value Ref Range    Lipase 170 (H) 13 - 60 U/L   CBC Auto Differential    Collection Time: 12/05/19  9:22 PM   Result Value Ref Range    WBC 17.47 (H) 3.40 - 10.80 10*3/mm3    RBC 5.09 3.77 - 5.28 10*6/mm3    Hemoglobin 14.7 12.0 - 15.9 g/dL    Hematocrit 45.1 34.0 - 46.6 %    MCV 88.6 79.0 - 97.0 fL    MCH 28.9 26.6 - 33.0 pg    MCHC 32.6 31.5 - 35.7 g/dL    RDW 12.5 12.3 - 15.4 %    RDW-SD 40.5 37.0 - 54.0 fl    MPV 10.4 6.0 - 12.0 fL    Platelets 408 140 - 450 10*3/mm3    Neutrophil % 71.2 42.7 - 76.0 %    Lymphocyte % 20.0 19.6 - 45.3 %    Monocyte % 8.0 5.0 - 12.0 %    Eosinophil % 0.2 (L) 0.3 - 6.2 %    Basophil % 0.3 0.0 - 1.5 %    Immature Grans % 0.3 0.0 - 0.5 %    Neutrophils, Absolute 12.43 (H) 1.70 - 7.00 10*3/mm3    Lymphocytes, Absolute 3.49 (H) 0.70 - 3.10 10*3/mm3    Monocytes, Absolute 1.39 (H) 0.10 - 0.90 10*3/mm3    Eosinophils, Absolute 0.04 0.00 - 0.40 10*3/mm3    Basophils, Absolute  "0.06 0.00 - 0.20 10*3/mm3    Immature Grans, Absolute 0.06 (H) 0.00 - 0.05 10*3/mm3    nRBC 0.0 0.0 - 0.2 /100 WBC   Procalcitonin    Collection Time: 12/05/19  9:22 PM   Result Value Ref Range    Procalcitonin 0.10 0.10 - 0.25 ng/mL     Note: In addition to lab results from this visit, the labs listed above may include labs taken at another facility or during a different encounter within the last 24 hours. Please correlate lab times with ED admission and discharge times for further clarification of the services performed during this visit.    US Gallbladder   Final Result   1.  Cholelithiasis with moderate thickening of the gallbladder wall.   2.  Hepatic steatosis.      Signer Name: Conrad Muro MD    Signed: 12/5/2019 8:59 PM    Workstation Name: [id number]     [facility]        Vitals:    12/05/19 1653 12/05/19 2130 12/05/19 2300   BP: (!) 196/110 136/83 158/89   BP Location: Left arm     Patient Position: Sitting     Pulse: 89 81 77   Resp: 16     Temp: 99.2 °F (37.3 °C)     SpO2: 97% 96% 99%   Weight: 78 kg (172 lb)     Height: 160 cm (63\")       Medications   sodium chloride 0.9 % flush 10 mL (not administered)   sodium chloride 0.9 % bolus 1,000 mL (0 mL Intravenous Stopped 12/5/19 2308)   HYDROmorphone (DILAUDID) injection 0.5 mg (0.5 mg Intravenous Given 12/5/19 2124)   ondansetron (ZOFRAN) injection 4 mg (4 mg Intravenous Given 12/5/19 2123)   piperacillin-tazobactam (ZOSYN) 3.375 g in iso-osmotic dextrose 50 ml (premix) (0 g Intravenous Stopped 12/5/19 2308)     ECG/EMG Results (last 24 hours)     ** No results found for the last 24 hours. **        ECG 12 Lead                           MDM    Final diagnoses:   Calculus of gallbladder with acute cholecystitis without obstruction   Acute abdominal pain   Leukocytosis, unspecified type       Documentation assistance provided by tj Ames.  Information recorded by the tj was done at my direction and has been " verified and validated by me.     Alyssa Dsouza  12/05/19 2041       Tevin Zelaya APRN  12/05/19 2247       Tevin Zelaya APRN  12/05/19 5513

## 2019-12-06 NOTE — ANESTHESIA POSTPROCEDURE EVALUATION
Patient: Yaritza Ray    Procedure Summary     Date:  12/06/19 Room / Location:   [facility] /  [facility]    Anesthesia Start:  1502 Anesthesia Stop:  1608    Procedure:  CHOLECYSTECTOMY LAPAROSCOPIC (N/A Abdomen) Diagnosis:  Acute calculous cholecystitis    Surgeon:  Bert Waters MD Provider:  Gato Grey MD    Anesthesia Type:  general ASA Status:  2          Anesthesia Type: general  Last vitals  BP   136/68   Temp   98.6   Pulse   88   Resp   16     SpO2   97     Post Anesthesia Care and Evaluation    Patient location during evaluation: PACU  Patient participation: complete - patient participated  Level of consciousness: sleepy but conscious  Pain score: 0  Pain management: adequate  Airway patency: patent  Anesthetic complications: No anesthetic complications  PONV Status: none  Cardiovascular status: hemodynamically stable and acceptable  Respiratory status: nonlabored ventilation, acceptable, nasal cannula and oral airway  Hydration status: acceptable

## 2019-12-06 NOTE — PLAN OF CARE
Problem: Pain, Acute (Adult)  Goal: Acceptable Pain Control/Comfort Level  Outcome: Ongoing (interventions implemented as appropriate)   12/06/19 0446   Pain, Acute (Adult)   Acceptable Pain Control/Comfort Level making progress toward outcome

## 2019-12-06 NOTE — H&P
[facility] Medicine Services  HISTORY AND PHYSICAL    Patient Name: Yaritza Barney  : 1960  MRN: [id number]  Primary Care Physician: Diana Claudio APRN  Date of admission: 2019      Subjective   Subjective     Chief Complaint:  Abdominal pain     HPI:  Yaritza Barney is a 59 y.o. female w/ a hx of HTN who presented to the ED w/ c/o abdominal pain.  Pt reports that she has had intermittent upper abdominal pain x 1 month. Around  she developed worsening abdominal pain w/ associated nausea and vomiting. This resolved after a day or so and then returned yesterday. Pt was seen by her PCP Thursday and a CT abd/pel was ordered; done at [facility]. Pt was sent to the ED for further evaluation after her CT abd appeared concerning for an cholecystitis.   Pt reports that she had a low grade fever of 99.2 PTA. Denies chills, chest pain, dyspnea, diarrhea, melena, dysuria, edema, syncope, confusion.   Pt evaluated in the ED then admitted to the hospital medicine service for further evaluation.       Review of Systems   Constitutional: Positive for chills and fever. Negative for activity change, appetite change, diaphoresis, fatigue and unexpected weight change.   HENT: Negative.    Eyes: Negative.    Respiratory: Negative.    Cardiovascular: Negative.    Gastrointestinal: Positive for abdominal pain, nausea and vomiting. Negative for abdominal distention, anal bleeding, blood in stool, constipation, diarrhea and rectal pain.   Endocrine: Negative.    Genitourinary: Negative.    Musculoskeletal: Negative.    Skin: Negative.    Allergic/Immunologic: Negative.    Neurological: Negative.    Hematological: Negative.    Psychiatric/Behavioral: Negative.    All other systems reviewed and are negative.       All other systems reviewed and are negative.     Personal History     Past Medical History:   Diagnosis Date   • Hypertension        Past Surgical History:   Procedure Laterality Date   •  HYSTERECTOMY     • TUBAL ABDOMINAL LIGATION         Family History: family history includes Dementia in her mother; Heart failure in her father. Otherwise pertinent FHx was reviewed and unremarkable.     Social History:  reports that she has never smoked. She has never used smokeless tobacco. She reports that she does not drink alcohol or use drugs.  Social History     Social History Narrative   • Not on file       Medications:  Available home medication information reviewed.  Medications Prior to Admission   Medication Sig Dispense Refill Last Dose   • hydroCHLOROthiazide (HYDRODIURIL) 12.5 MG tablet Take 12.5 mg by mouth Daily As Needed (for increased bp).      • lisinopril (PRINIVIL,ZESTRIL) 10 MG tablet Take 10 mg by mouth Daily As Needed (increased bp).          No Known Allergies    Objective   Objective     Vital Signs:   Temp:  [98.3 °F (36.8 °C)-99.2 °F (37.3 °C)] 98.3 °F (36.8 °C)  Heart Rate:  [71-89] 80  Resp:  [16] 16  BP: (136-196)/() 165/95        Physical Exam   Constitutional: She is oriented to person, place, and time. She appears well-developed and well-nourished. No distress.   HENT:   Head: Normocephalic and atraumatic.   Eyes: EOM are normal. Pupils are equal, round, and reactive to light.   Neck: Normal range of motion. Neck supple.   Cardiovascular: Normal rate, regular rhythm, normal heart sounds and intact distal pulses. Exam reveals no gallop and no friction rub.   No murmur heard.  Pulmonary/Chest: Effort normal and breath sounds normal. No stridor. No respiratory distress. She has no wheezes. She has no rales. She exhibits no tenderness.   Abdominal: Soft. Bowel sounds are normal. She exhibits no distension. There is no tenderness. There is no guarding.   Musculoskeletal: Normal range of motion. She exhibits no edema, tenderness or deformity.   Neurological: She is alert and oriented to person, place, and time. No cranial nerve deficit.   Skin: Skin is warm and dry. Capillary  refill takes 2 to 3 seconds. No rash noted. She is not diaphoretic. No erythema. No pallor.   Psychiatric: She has a normal mood and affect. Her behavior is normal. Judgment and thought content normal.   Nursing note and vitals reviewed.       Results Reviewed:  I have personally reviewed current lab and radiology data.    Results from last 7 days   Lab Units 12/05/19 2122   WBC 10*3/mm3 17.47*   HEMOGLOBIN g/dL 14.7   HEMATOCRIT % 45.1   PLATELETS 10*3/mm3 408     Results from last 7 days   Lab Units 12/05/19 2122   SODIUM mmol/L 138   POTASSIUM mmol/L 3.5   CHLORIDE mmol/L 95*   CO2 mmol/L 29.0   BUN mg/dL 12   CREATININE mg/dL 0.79   GLUCOSE mg/dL 131*   CALCIUM mg/dL 9.7   ALT (SGPT) U/L 14   AST (SGOT) U/L 16   TROPONIN T ng/mL <0.010   LACTATE mmol/L 1.2   PROCALCITONIN ng/mL 0.10     Estimated Creatinine Clearance: 75.2 mL/min (by C-G formula based on SCr of 0.79 mg/dL).  Brief Urine Lab Results  (Last result in the past 365 days)      Color   Clarity   Blood   Leuk Est   Nitrite   Protein   CREAT   Urine HCG        12/05/19 2113 Yellow Clear Negative Negative Negative 30 mg/dL (1+)             Imaging Results (Last 24 Hours)     Procedure Component Value Units Date/Time    US Gallbladder [[id number]] Collected:  12/05/19 2059     Updated:  12/05/19 2102    Narrative:       US Abdomen Ltd    INDICATION:   One week history of epigastric pain with nausea and vomiting.    COMPARISON:   None available.    FINDINGS:  PANCREAS: Visualized portions of the pancreas are within normal limits.     LIVER: The liver demonstrates hepatic steatosis. No hepatic mass. The intrahepatic bile ducts are normal in caliber. The common duct is normal in size at the roe hepatis measuring 5 mm.    GALLBLADDER: The gallbladder demonstrates moderate thickening of the gallbladder wall. There are multiple small gallstones demonstrated within the gallbladder.    RIGHT KIDNEY: The right kidney measures 10.1 cm. Renal cortical thickness  and echogenicity is normal. No hydronephrosis.    OTHER: No ascites.      Impression:       1.  Cholelithiasis with moderate thickening of the gallbladder wall.  2.  Hepatic steatosis.    Signer Name: Conrad Muro MD   Signed: 12/5/2019 8:59 PM   Workstation Name: [id number]    [facility]             Assessment/Plan   Assessment / Plan     Active Hospital Problems    Diagnosis POA   • **Calculus of gallbladder with acute cholecystitis without obstruction [K80.00] Yes   • Leukocytosis [D72.829] Yes   • HTN (hypertension) [I10] Yes   • Pancreatitis [K85.90] Unknown       Assessment & Plan    **Cholecystitis, cholelithiasis   -gallbladder US reviewed   -unable to obtain results from CT done at [facility]; repeat CT abd/pel w/ contrast ordered/pending  -blood cx collected  -IV Zosyn started in ED; continue  -s/p 1 liter NS bolus in ED  -continue NS @ 75ml/hour  -symptom mgt; PRN dilaudid and Zofran  -baseline EKG obtained, baseline CXR pending   -type/screen, hem a1c, cbc, bmp, coags- this am  -NPO  -General Surgery consult this am   - to bring disc of CT of abd and pelvis back with him tomorrow     **Leukocytosis   -pt reports low grade fever of 99.2 PTA  -blood cx pending   -IV Zosyn  -baseline CXR pending  -UA unremarkable    **HTN  -pt takes Lisinopril and HCTZ PRN for elevated BP- both held   - she states she only takes her BP meds when her BP goes up.     **Pancreatitis   -keep npo and hydrate with iv fluids.     DVT prophylaxis:  Mechanical     CODE STATUS:    Code Status and Medical Interventions:   Ordered at: 12/06/19 0324     Code Status:    CPR     Medical Interventions (Level of Support Prior to Arrest):    Full       Admission Status:  I believe this patient meets INPATIENT status due to acute cholecystitis and cholelithiasis.  I feel patient’s risk for adverse outcomes and need for care warrant INPATIENT evaluation and I predict the patient’s care encounter to  likely last beyond 2 midnights.    Electronically signed by IVANA Buchanan, 12/06/19, 3:24 AM.      Attending   Admission Attestation       I have seen and examined the patient, performing an independent face-to-face diagnostic evaluation with plan of care reviewed and developed with the advanced practice clinician (APC).      Brief Summary Statement:   Yaritza Barney is a 59 y.o. female with PMHx of HTN.  Presented to [facility] ED after being advised to come to the ED by her PCP.  Patient states she developed intermittent right upper quadrant pain for approximately 1 month.  States that the pain was more severe and associated with nausea/vomiting after eating Thanksgiving dinner.  She was evaluated by PCP on Thursday at [facility] and a CT of the abdomen and pelvis was obtained and she was noted to have Cholecystitis and cholelithiasis.  She was advised to come to the ED.  She was also noted to have a temp of 99.2 prior to arrival.  Upon presentation to the ED a right upper quadrant ultrasound was obtained and patient was noted to have cholelithiasis with moderate thickening of the gallbladder wall and hepatic steatosis.  Patient will be admitted we will consult general surgery for possible cholecystectomy.       Remainder of detailed HPI is as noted by APC and has been reviewed and/or edited by me for completeness.    Attending Physical Exam:  Constitutional: No acute distress, awake, alert  HENT: NCAT, mucous membranes moist  Respiratory: Clear to auscultation bilaterally, respiratory effort normal   Cardiovascular: RRR, no murmurs, rubs, or gallops, palpable pedal pulses bilaterally  Gastrointestinal: Positive bowel sounds, soft, epigastric and RUQ tenderness, nondistended  Musculoskeletal: No bilateral ankle edema  Psychiatric: Appropriate affect, cooperative  Neurologic: Oriented x 3, strength symmetric in all extremities, Cranial Nerves grossly intact to confrontation, speech clear  Skin: No rashes      Brief  Assessment/Plan :  See detailed assessment and plan developed with APC which I have reviewed and/or edited for completeness.    Electronically signed by Angeles Patel DO, 12/06/19, 3:42 AM.

## 2019-12-06 NOTE — BRIEF OP NOTE
CHOLECYSTECTOMY LAPAROSCOPIC POSSIBLE OPEN CHOLECYSTECTOMY  Progress Note    Yaritza Ray  12/6/2019    Pre-op Diagnosis:   Acute calculous cholecystitis       Post-Op Diagnosis Codes:     * Acute calculous cholecystitis [K80.00]    Procedure/CPT® Codes:      Procedure(s):  CHOLECYSTECTOMY LAPAROSCOPIC    Surgeon(s):  Bert Waters MD    Anesthesia: General    Staff:   Circulator: Linda Cook RN  Scrub Person: Mario Khalil  Nursing Assistant: Amanda Castro    Estimated Blood Loss: minimal    Urine Voided: * No values recorded between 12/6/2019  3:02 PM and 12/6/2019  3:59 PM *    Specimens:                Specimens     ID Source Type Tests Collected By Collected At Frozen?      A Gallbladder Tissue · TISSUE PATHOLOGY EXAM   Bert Waters MD 12/6/19 1523 No     Description: gallbladder for permanent                Drains:   Closed/Suction Drain 1 Abdomen Bulb 15 Fr. (Active)       Findings: inflamed GB with stones    Complications: none      Bert Waters MD     Date: 12/6/2019  Time: 4:00 PM

## 2019-12-06 NOTE — CONSULTS
Yaritza Barney  [id number]  1960       Patient Care Team:  Diana Claudio APRN as PCP - General (Nurse Practitioner)        General Surgery Consult  Date 12/6/2019  Consultant Dr. Cammie Corey    Chief complaint epigastric abdominal pain    Subjective     Patient is a 59 y.o. female presents with complaints of epigastric abdominal pain. Onset of symptoms was abrupt starting 1 month ago.  Pain was intermittent at that time however since Thanksgiving she has had more frequent episodes of abdominal pain that progressed to nausea and vomiting recently.  Patient was seen by her  primary physician who ordered a CT scan of the abdomen pelvis that was remarkable for concern about acute calculus cholecystitis.  As such she was referred to the emergency room for further evaluation.  She was noted to have a low-grade fever.  White blood count was mildly elevated 12,000 and liver function tests were unremarkable.  Gallbladder ultrasound showed thickened gallbladder wall with cholelithiasis and a normal common bile duct.  Patient was started on IV antibiotics.  She has a strong family history of gallbladder disease.  She had about 18 pounds weight loss intentionally over the last few months.  Abdominal surgeries include tubal ligation as well as a total.hysterectomy.     Allergy: No Known Allergies    Home Medications:   No current facility-administered medications on file prior to encounter.      Current Outpatient Medications on File Prior to Encounter   Medication Sig   • hydroCHLOROthiazide (HYDRODIURIL) 12.5 MG tablet Take 12.5 mg by mouth Daily As Needed (for increased bp).   • lisinopril (PRINIVIL,ZESTRIL) 10 MG tablet Take 10 mg by mouth Daily As Needed (increased bp).       PMHx:   Patient Active Problem List   Diagnosis   • Calculus of gallbladder with acute cholecystitis without obstruction   • Leukocytosis   • HTN (hypertension)   • Pancreatitis       Past Surgical History:   Tubal ligation  Total abdominal  "hysterectomy    Family History:  Family History   Problem Relation Age of Onset   • Dementia Mother    • Heart failure Father        Social History:  Social History     Socioeconomic History   • Marital status:      Spouse name: Not on file   • Number of children: Not on file   • Years of education: Not on file   • Highest education level: Not on file   Tobacco Use   • Smoking status: Never Smoker   • Smokeless tobacco: Never Used   Substance and Sexual Activity   • Alcohol use: No     Frequency: Never   • Drug use: No   • Sexual activity: Defer       Review of Systems   Pertinent items are noted in HPI      Physical Exam:    Objective     Vital Signs  Blood pressure 148/81, pulse 83, temperature 98 °F (36.7 °C), temperature source Oral, resp. rate 18, height 160 cm (63\"), weight 78.6 kg (173 lb 3.2 oz), SpO2 93 %, not currently breastfeeding.    Intake/Output Summary (Last 24 hours) at 12/6/2019 1349  Last data filed at 12/6/2019 1110  Gross per 24 hour   Intake 1100 ml   Output --   Net 1100 ml       Physical Exam:      General Appearance:    Alert, cooperative, in no acute distress   Head:    Normocephalic, without obvious abnormality, atraumatic   Eyes:            Lids and lashes normal, conjunctivae and sclerae normal, no   icterus, no pallor, corneas clear, PERRLA   Ears:    Ears appear intact with no abnormalities noted       Neck:   No adenopathy, supple, trachea midline, no thyromegaly, no     carotid bruit, no JVD   Back:     No kyphosis present, no scoliosis present, no skin lesions,       erythema or scars, no tenderness to percussion or                   palpation,   range of motion normal   Lungs:     Clear to auscultation,respirations regular, even and                   unlabored    Heart:    Regular rhythm and normal rate, normal S1 and S2, no            murmur, no gallop, no rub, no click   Breast Exam:    Deferred   Abdomen:     Normal bowel sounds, no masses, no organomegaly, soft        " mild epigastric tenderness to deep palpation with no guarding or peritoneal signs   Genitalia:    Deferred   Extremities:   Moves all extremities well, no edema, no cyanosis, no              redness   Pulses:   Pulses palpable and equal bilaterally   Skin:   No bleeding, bruising or rash   Lymph nodes:   No palpable adenopathy   Neurologic:   Cranial nerves 2 - 12 grossly intact, sensation intact, DTR        present and equal bilaterally       Results Review:    I reviewed the patient's new clinical results.  Results from last 7 days   Lab Units 12/06/19  0400   WBC 10*3/mm3 12.71*   HEMOGLOBIN g/dL 12.5   HEMATOCRIT % 39.7   PLATELETS 10*3/mm3 339     Results from last 7 days   Lab Units 12/06/19  0400   SODIUM mmol/L 137   POTASSIUM mmol/L 3.1*   CHLORIDE mmol/L 97*   CO2 mmol/L 29.0   BUN mg/dL 11   CREATININE mg/dL 0.67   GLUCOSE mg/dL 101*   CALCIUM mg/dL 8.6       ASSESSMENT AND PLAN:   Acute calculus cholecystitis  Patient is clinically stable  I recommend proceeding with laparoscopic cholecystectomy which I discussed at length with her and her family.  The risks of anesthesia, infection, bleeding, possible common bile duct, liver as well as bowel injury and open cholecystectomy were discussed.  She understands and is willing to proceed with the surgery.          Calculus of gallbladder with acute cholecystitis without obstruction    Leukocytosis    HTN (hypertension)    Pancreatitis        I discussed the patients findings and my recommendations with patient, family and nursing staff.   Thank you for the consultation.    Bert Waters MD  12/06/19  1:49 PM

## 2019-12-06 NOTE — PROGRESS NOTES
[facility] Medicine Services  ADMISSION FOLLOW-UP NOTE          Patient admitted after midnight, H&P by my partner performed earlier on today's date reviewed.  Interim findings, labs, and charting also reviewed.        The [facility] Hospital Problem List has been managed and updated to include any new diagnoses:  Active Hospital Problems    Diagnosis  POA   • **Calculus of gallbladder with acute cholecystitis without obstruction [K80.00]  Yes   • Leukocytosis [D72.829]  Yes   • HTN (hypertension) [I10]  Yes   • Adrenal nodule (CMS/HCC) [E27.9]  Yes      Resolved Hospital Problems   No resolved problems to display.         ADDITIONAL PLAN:  - detailed assessment and plan form admission reviewed    Acute cholecystitis   - To OR today for cholecystectomy  - Continue Zosyn    *Indeterminate Left adrenal nodule noted on CT abdomen/pelvis incidentally  - Will need follow up MRI as an outpatient with PCP if not previously worked up    HTN  -Resume home antihypertensives as needed    Elevated lipase  -Likely related to cholecystitis.  Do not think she has pancreatitis as this is not 3x ULN and no CT evidence of pancreatitis.    Electronically signed by Cammie Corey MD, 12/06/19, 4:05 PM.

## 2019-12-06 NOTE — OP NOTE
Operative Note    Date of Surgery:  12/6/2019    Pre-Operative Diagnosis: Acute calculus cholecystitis    Post-Operative Diagnosis: Acute calculus cholecystitis    Procedure: Laparoscopic cholecystectomy    Anesthesia: General    Surgeon:  eBrt Waters MD    Estimated Blood Loss: Very minimal    Findings: Inflamed and distended gallbladder with multiple stones    Complications: None      Indication for Procedure: Ms. Barney is a 59 years old lady who was admitted to the emergency room with complaints of epigastric abdominal pain and nausea and vomiting that has progressed over the last month.  She was noted to have mild leukocytosis with normal liver function test however CT scan of the abdomen pelvis as well as gallbladder ultrasound were remarkable for acute calculus cholecystitis.  Patient was started on IV antibiotics and is taken to the operating today for laparoscopic cholecystectomy, possible open.    Procedure: Patient was taken operating by anesthesia placed supine on the table.  Following induction of general endotracheal anesthesia, SCDs were placed.  She received Zosyn IV on the floor.  The abdomen was prepped and draped in a sterile fashion.  Timeout was observed.  Marcaine with 5% was administered in the infraumbilical region and a small stab incision was made.  The fascia was incised to enter the abdominal cavity.  The Hill introducer was advanced and the abdomen insufflated to 15 mmHg using CO2.  The 10 mm scope was advanced and the abdomen inspected.  Some very minimal omental adhesions were noted in the pelvic area.  The patient was placed in reverse Trendelenburg position, right side up.  An 10 mm trocar was placed in the epigastric region and two 5 mm trochars were placed in the right upper quadrant under direct vision.  The gallbladder was covered with omentum.  The omentum was peeled down to expose and inflamed and distended gallbladder.  We proceeded by decompressing the fundus and  aspirating approximately 20 mL of dark bile.  The fundus was then grasped and pulled over the liver bed.  The infundibulum was pulled inferiorly and laterally.  With gentle dissection the cystic duct was identified.  It was not dilated.  The junction of the common bile duct was noted.  No stones were appreciated in the cystic duct.  The cystic duct was then clipped with multiple clips and transected as well as the cystic artery.  The gallbladder was then removed off of the liver bed with cautery, placed in an Endo Catch bag and delivered out of the abdomen through the umbilical port intact and sent to pathology.  Multiple stones were appreciated in the gallbladder.  Following adequate hemostasis of the liver bed with cautery, it was well irrigated with normal saline with clear return of fluid noted.  The clips were intact with no evidence of bile leak or bleeding noted.  A 15 Italian round David-Metz drain was placed through port site and anchored under the liver.  It was attached to the skin with 2-0 silk suture.  The rest of the trochars were removed under direct vision with no bleeding encountered.  The abdomen was deflated.  The umbilical fascia was approximated with 0 Vicryl sutures and the skin incisions were approximated with 4-0 Monocryl subcuticular suture.  Steri-Strips and sterile dressing was applied.  The patient tolerated procedure well with no complications.  She was extubated and taken recovery room stable condition.  Sponge count and needle count were correct at the end of the procedure.    Bert Waters MD  12/06/19  4:14 PM

## 2019-12-07 LAB
ALBUMIN SERPL-MCNC: 3.6 G/DL (ref 3.5–5.2)
ALBUMIN/GLOB SERPL: 1.1 G/DL
ALP SERPL-CCNC: 88 U/L (ref 39–117)
ALT SERPL W P-5'-P-CCNC: 56 U/L (ref 1–33)
ANION GAP SERPL CALCULATED.3IONS-SCNC: 10 MMOL/L (ref 5–15)
AST SERPL-CCNC: 53 U/L (ref 1–32)
BILIRUB SERPL-MCNC: 0.3 MG/DL (ref 0.2–1.2)
BUN BLD-MCNC: 11 MG/DL (ref 8–23)
BUN/CREAT SERPL: 15.7 (ref 7–25)
CALCIUM SPEC-SCNC: 9.6 MG/DL (ref 8.6–10.5)
CHLORIDE SERPL-SCNC: 102 MMOL/L (ref 98–107)
CO2 SERPL-SCNC: 28 MMOL/L (ref 22–29)
CREAT BLD-MCNC: 0.7 MG/DL (ref 0.57–1)
DEPRECATED RDW RBC AUTO: 43.8 FL (ref 37–54)
ERYTHROCYTE [DISTWIDTH] IN BLOOD BY AUTOMATED COUNT: 12.8 % (ref 12.3–15.4)
GFR SERPL CREATININE-BSD FRML MDRD: 85 ML/MIN/1.73
GLOBULIN UR ELPH-MCNC: 3.4 GM/DL
GLUCOSE BLD-MCNC: 108 MG/DL (ref 65–99)
HCT VFR BLD AUTO: 40.8 % (ref 34–46.6)
HGB BLD-MCNC: 12.8 G/DL (ref 12–15.9)
MCH RBC QN AUTO: 29.1 PG (ref 26.6–33)
MCHC RBC AUTO-ENTMCNC: 31.4 G/DL (ref 31.5–35.7)
MCV RBC AUTO: 92.7 FL (ref 79–97)
PLATELET # BLD AUTO: 362 10*3/MM3 (ref 140–450)
PMV BLD AUTO: 10.6 FL (ref 6–12)
POTASSIUM BLD-SCNC: 4.7 MMOL/L (ref 3.5–5.2)
POTASSIUM BLD-SCNC: 4.7 MMOL/L (ref 3.5–5.2)
PROT SERPL-MCNC: 7 G/DL (ref 6–8.5)
RBC # BLD AUTO: 4.4 10*6/MM3 (ref 3.77–5.28)
SODIUM BLD-SCNC: 140 MMOL/L (ref 136–145)
WBC NRBC COR # BLD: 13.47 10*3/MM3 (ref 3.4–10.8)

## 2019-12-07 PROCEDURE — 25010000002 PIPERACILLIN SOD-TAZOBACTAM PER 1 G: Performed by: NURSE PRACTITIONER

## 2019-12-07 PROCEDURE — 85027 COMPLETE CBC AUTOMATED: CPT | Performed by: SURGERY

## 2019-12-07 PROCEDURE — 84132 ASSAY OF SERUM POTASSIUM: CPT | Performed by: INTERNAL MEDICINE

## 2019-12-07 PROCEDURE — 99233 SBSQ HOSP IP/OBS HIGH 50: CPT | Performed by: INTERNAL MEDICINE

## 2019-12-07 PROCEDURE — 94799 UNLISTED PULMONARY SVC/PX: CPT

## 2019-12-07 PROCEDURE — 80053 COMPREHEN METABOLIC PANEL: CPT | Performed by: SURGERY

## 2019-12-07 RX ADMIN — TAZOBACTAM SODIUM AND PIPERACILLIN SODIUM 3.38 G: 375; 3 INJECTION, SOLUTION INTRAVENOUS at 21:03

## 2019-12-07 RX ADMIN — TAZOBACTAM SODIUM AND PIPERACILLIN SODIUM 3.38 G: 375; 3 INJECTION, SOLUTION INTRAVENOUS at 04:02

## 2019-12-07 RX ADMIN — HYDROCODONE BITARTRATE AND ACETAMINOPHEN 1 TABLET: 5; 325 TABLET ORAL at 10:02

## 2019-12-07 RX ADMIN — TAZOBACTAM SODIUM AND PIPERACILLIN SODIUM 3.38 G: 375; 3 INJECTION, SOLUTION INTRAVENOUS at 11:39

## 2019-12-07 RX ADMIN — HYDROCODONE BITARTRATE AND ACETAMINOPHEN 1 TABLET: 5; 325 TABLET ORAL at 18:59

## 2019-12-07 NOTE — PROGRESS NOTES
"Yaritza Ray  1960  [id number]    Surgery Progress Note    Date of visit: 12/7/2019    Subjective: Patient is feeling better  Tolerated full liquids    Objective:    /87 (BP Location: Left arm, Patient Position: Lying)   Pulse 58   Temp 98.2 °F (36.8 °C) (Oral)   Resp 18   Ht 160 cm (63\")   Wt 78.6 kg (173 lb 3.2 oz)   SpO2 94%   Breastfeeding No   BMI 30.68 kg/m²     Intake/Output Summary (Last 24 hours) at 12/7/2019 0837  Last data filed at 12/7/2019 0402  Gross per 24 hour   Intake 50 ml   Output 1670 ml   Net -1620 ml       CV: Regular rate and rhythm  L: normal air entry    Abd: Minimal puncture site tenderness  David-Metz with nonbilious drainage    LABS:    Results from last 7 days   Lab Units 12/07/19  0452   WBC 10*3/mm3 13.47*   HEMOGLOBIN g/dL 12.8   HEMATOCRIT % 40.8   PLATELETS 10*3/mm3 362     Results from last 7 days   Lab Units 12/07/19  0409   SODIUM mmol/L 140   POTASSIUM mmol/L 4.7  4.7   CHLORIDE mmol/L 102   CO2 mmol/L 28.0   BUN mg/dL 11   CREATININE mg/dL 0.70   CALCIUM mg/dL 9.6   BILIRUBIN mg/dL 0.3   ALK PHOS U/L 88   ALT (SGPT) U/L 56*   AST (SGOT) U/L 53*   GLUCOSE mg/dL 108*     Results from last 7 days   Lab Units 12/07/19  0409   SODIUM mmol/L 140   POTASSIUM mmol/L 4.7  4.7   CHLORIDE mmol/L 102   CO2 mmol/L 28.0   BUN mg/dL 11   CREATININE mg/dL 0.70   GLUCOSE mg/dL 108*   CALCIUM mg/dL 9.6     Lab Results   Lab Value Date/Time    LIPASE 170 (H) 12/05/2019 2122         Assessment/ Plan:   Stable course status post laparoscopic cholecystectomy secondary to acute calculus cholecystitis  Patient is progressing well  Continue with IV antibiotics  Advance diet  Increase activity  Encourage incentive spirometer  Hopefully home tomorrow    Problem List Items Addressed This Visit        Digestive    * (Principal) Calculus of gallbladder with acute cholecystitis without obstruction - Primary       Immune and Lymphatic    Leukocytosis      Other Visit Diagnoses     Acute " abdominal pain        Cholecystitis        Relevant Orders    Tissue Pathology Exam    Calculus of gallbladder        Relevant Orders    Tissue Pathology Exam            Bert Waters MD  12/7/2019  8:37 AM

## 2019-12-07 NOTE — PROGRESS NOTES
[facility] Medicine Services  PROGRESS NOTE    Patient Name: Yaritza Barney  : 1960  MRN: [id number]    Date of Admission: 2019  Primary Care Physician: Diana Claudio APRN    Subjective   Subjective     CC: f/u cholecystitis    HPI: Up walking around room. Pain relatively well controlled. Eating well.    Review of Systems  Gen- No fevers, chills  CV- No chest pain, palpitations  Resp- No cough, dyspnea  GI- No N/V/D, abd pain    Objective   Objective     Vital Signs:   Temp:  [97.8 °F (36.6 °C)-98.6 °F (37 °C)] 98.2 °F (36.8 °C)  Heart Rate:  [58-96] 58  Resp:  [14-20] 18  BP: (128-160)/(67-97) 141/87        Physical Exam:  Constitutional: No acute distress, awake, alert  HENT: NCAT, mucous membranes moist  Respiratory: Clear to auscultation bilaterally, respiratory effort normal   Cardiovascular: RRR, no murmurs, rubs, or gallops, palpable pedal pulses bilaterally  Gastrointestinal: Positive bowel sounds, soft, nontender, nondistended, dean drain in place  Musculoskeletal: No bilateral ankle edema  Psychiatric: Appropriate affect, cooperative  Neurologic: Oriented x 3, strength symmetric in all extremities, Cranial Nerves grossly intact to confrontation, speech clear  Skin: No rashes    Results Reviewed:    Results from last 7 days   Lab Units 19  0452 19  04019   WBC 10*3/mm3 13.47* 12.71* 17.47*   HEMOGLOBIN g/dL 12.8 12.5 14.7   HEMATOCRIT % 40.8 39.7 45.1   PLATELETS 10*3/mm3 362 339 408   INR   --  1.07  --    PROCALCITONIN ng/mL  --   --  0.10     Results from last 7 days   Lab Units 19  0409 19  0400 192   SODIUM mmol/L 140 137 138   POTASSIUM mmol/L 4.7  4.7 3.1* 3.5   CHLORIDE mmol/L 102 97* 95*   CO2 mmol/L 28.0 29.0 29.0   BUN mg/dL 11 11 12   CREATININE mg/dL 0.70 0.67 0.79   GLUCOSE mg/dL 108* 101* 131*   CALCIUM mg/dL 9.6 8.6 9.7   ALT (SGPT) U/L 56*  --  14   AST (SGOT) U/L 53*  --  16   TROPONIN T ng/mL  --   --  <0.010      Estimated Creatinine Clearance: 84.9 mL/min (by C-G formula based on SCr of 0.7 mg/dL).    Microbiology Results Abnormal     Procedure Component Value - Date/Time    Blood Culture - Blood, Arm, Right [[id number]] Collected:  12/05/19 2140    Lab Status:  Preliminary result Specimen:  Blood from Arm, Right Updated:  12/06/19 2220     Blood Culture No growth at 24 hours    Blood Culture - Blood, Arm, Left [[id number]] Collected:  12/05/19 2130    Lab Status:  Preliminary result Specimen:  Blood from Arm, Left Updated:  12/06/19 2220     Blood Culture No growth at 24 hours        CXR personally reviewed showing no acute disease. Agree with interpretation.  Imaging Results (Last 24 Hours)     Procedure Component Value Units Date/Time    XR Chest 1 View [[id number]] Collected:  12/06/19 0901     Updated:  12/06/19 1304    Narrative:       EXAMINATION: XR CHEST 1 VW-      INDICATION: Baseline; preop; K80.00-Calculus of gallbladder with acute  cholecystitis without obstruction; R10.9-Unspecified abdominal pain;  D72.829-Elevated white blood cell count, unspecified.      COMPARISON: 12/06/2019.     FINDINGS: The cardiac silhouette is normal there is no pulmonary  inflammatory process, mass or effusion.           Impression:       No active disease.     This report was finalized on 12/6/2019 1:01 PM by Dr. Nash Lan MD.                  I have reviewed the medications:  Scheduled Meds:  piperacillin-tazobactam 3.375 g Intravenous Q8H   sodium chloride 10 mL Intravenous Q12H     Continuous Infusions:  lactated ringers 9 mL/hr Last Rate: 9 mL/hr (12/06/19 1440)   sodium chloride 50 mL/hr Last Rate: 50 mL/hr (12/06/19 1720)     PRN Meds:.•  HYDROcodone-acetaminophen  •  HYDROmorphone  •  ondansetron **OR** [DISCONTINUED] ondansetron  •  potassium chloride **OR** potassium chloride **OR** potassium chloride  •  [COMPLETED] Insert peripheral IV **AND** sodium chloride  •  sodium chloride      Assessment/Plan   Assessment /  Plan     Active Hospital Problems    Diagnosis  POA   • **Calculus of gallbladder with acute cholecystitis without obstruction [K80.00]  Yes   • Leukocytosis [D72.829]  Yes   • HTN (hypertension) [I10]  Yes   • Adrenal nodule (CMS/HCC) [E27.9]  Yes      Resolved Hospital Problems   No resolved problems to display.        Brief Hospital Course to date:    Acute calculus cholecystitis  Mild gallstone pancreatitis  Leukocytosis  -S/P lap hawa 12/6 w/ drain in place. D/W AJ today, continue IV abx today and if does well expect home tomorrow.  -Will continue IV zosyn.  -Pain control, ambulate, ICS     HTN  -Reviewed for past 24 hours and uncontrolled. Resume low dose lisinopril.    **Indeterminate 2.4 cm left adrenal nodule. Further characterization with nonemergent multiphase contrast-enhanced MRI of the abdomen is recommended as outpt. D/W patient.     DVT prophylaxis:  Mechanical     CODE STATUS:   Code Status and Medical Interventions:   Ordered at: 12/06/19 0324     Code Status:    CPR     Medical Interventions (Level of Support Prior to Arrest):    Full         Electronically signed by Jessica Rock II, DO, 12/07/19, 12:51 PM.

## 2019-12-08 VITALS
BODY MASS INDEX: 30.69 KG/M2 | SYSTOLIC BLOOD PRESSURE: 165 MMHG | WEIGHT: 173.2 LBS | HEIGHT: 63 IN | HEART RATE: 65 BPM | OXYGEN SATURATION: 94 % | DIASTOLIC BLOOD PRESSURE: 93 MMHG | RESPIRATION RATE: 16 BRPM | TEMPERATURE: 97.5 F

## 2019-12-08 LAB
DEPRECATED RDW RBC AUTO: 44.1 FL (ref 37–54)
ERYTHROCYTE [DISTWIDTH] IN BLOOD BY AUTOMATED COUNT: 13 % (ref 12.3–15.4)
HCT VFR BLD AUTO: 41.4 % (ref 34–46.6)
HGB BLD-MCNC: 12.8 G/DL (ref 12–15.9)
MCH RBC QN AUTO: 28.6 PG (ref 26.6–33)
MCHC RBC AUTO-ENTMCNC: 30.9 G/DL (ref 31.5–35.7)
MCV RBC AUTO: 92.6 FL (ref 79–97)
PLATELET # BLD AUTO: 365 10*3/MM3 (ref 140–450)
PMV BLD AUTO: 10.8 FL (ref 6–12)
RBC # BLD AUTO: 4.47 10*6/MM3 (ref 3.77–5.28)
WBC NRBC COR # BLD: 10.85 10*3/MM3 (ref 3.4–10.8)

## 2019-12-08 PROCEDURE — 99239 HOSP IP/OBS DSCHRG MGMT >30: CPT | Performed by: INTERNAL MEDICINE

## 2019-12-08 PROCEDURE — 25010000002 PIPERACILLIN SOD-TAZOBACTAM PER 1 G: Performed by: NURSE PRACTITIONER

## 2019-12-08 PROCEDURE — 85027 COMPLETE CBC AUTOMATED: CPT | Performed by: SURGERY

## 2019-12-08 RX ORDER — ONDANSETRON 4 MG/1
4 TABLET, FILM COATED ORAL EVERY 6 HOURS PRN
Qty: 20 TABLET | Refills: 0 | Status: SHIPPED | OUTPATIENT
Start: 2019-12-08

## 2019-12-08 RX ORDER — HYDROCODONE BITARTRATE AND ACETAMINOPHEN 5; 325 MG/1; MG/1
1 TABLET ORAL EVERY 6 HOURS PRN
Start: 2019-12-08 | End: 2019-12-16

## 2019-12-08 RX ADMIN — TAZOBACTAM SODIUM AND PIPERACILLIN SODIUM 3.38 G: 375; 3 INJECTION, SOLUTION INTRAVENOUS at 05:13

## 2019-12-08 NOTE — PROGRESS NOTES
"Yaritza Ray  1960  [id number]    Surgery Progress Note    Date of visit: 12/8/2019    Subjective: Feels better   tolerating diet  Ambulating      Objective:    /93 (BP Location: Left arm, Patient Position: Lying)   Pulse 65   Temp 97.5 °F (36.4 °C) (Oral)   Resp 16   Ht 160 cm (63\")   Wt 78.6 kg (173 lb 3.2 oz)   SpO2 94%   Breastfeeding No   BMI 30.68 kg/m²     Intake/Output Summary (Last 24 hours) at 12/8/2019 0759  Last data filed at 12/8/2019 0600  Gross per 24 hour   Intake 1370 ml   Output 700 ml   Net 670 ml       CV: Regular rate and rhythm  L: normal air entry    Abd: Nondistended and soft with very minimal puncture site tenderness  David-Metz with minimal serosanguineous drainage      LABS:    Results from last 7 days   Lab Units 12/08/19  0635   WBC 10*3/mm3 10.85*   HEMOGLOBIN g/dL 12.8   HEMATOCRIT % 41.4   PLATELETS 10*3/mm3 365     Results from last 7 days   Lab Units 12/07/19  0409   SODIUM mmol/L 140   POTASSIUM mmol/L 4.7  4.7   CHLORIDE mmol/L 102   CO2 mmol/L 28.0   BUN mg/dL 11   CREATININE mg/dL 0.70   CALCIUM mg/dL 9.6   BILIRUBIN mg/dL 0.3   ALK PHOS U/L 88   ALT (SGPT) U/L 56*   AST (SGOT) U/L 53*   GLUCOSE mg/dL 108*     Results from last 7 days   Lab Units 12/07/19  0409   SODIUM mmol/L 140   POTASSIUM mmol/L 4.7  4.7   CHLORIDE mmol/L 102   CO2 mmol/L 28.0   BUN mg/dL 11   CREATININE mg/dL 0.70   GLUCOSE mg/dL 108*   CALCIUM mg/dL 9.6     Lab Results   Lab Value Date/Time    LIPASE 170 (H) 12/05/2019 2122         Assessment/ Plan: Stable course status post laparoscopic cholecystectomy secondary to acute calculus cholecystitis  Patient is progressing well  White blood count is normal  Home today  Norco as needed  Follow-up in 1 week      Problem List Items Addressed This Visit        Digestive    * (Principal) Calculus of gallbladder with acute cholecystitis without obstruction - Primary       Immune and Lymphatic    Leukocytosis      Other Visit Diagnoses     Acute " abdominal pain        Cholecystitis        Relevant Orders    Tissue Pathology Exam    Calculus of gallbladder        Relevant Orders    Tissue Pathology Exam            Bert Waters MD  12/8/2019  7:59 AM

## 2019-12-08 NOTE — DISCHARGE SUMMARY
[facility]  DISCHARGE SUMMARY    Patient Name: Yaritza Barney  : 1960  MRN: [id number]    Date of Admission: 2019  7:59 PM  Date of Discharge: 2019  Primary Care Physician: Diana Claudio APRN    Consults     Date and Time Order Name Status Description    2019 0324 Inpatient General Surgery Consult Completed           Hospital Course     Presenting Problem:   Calculus of gallbladder with acute cholecystitis without obstruction [K80.00]    Active Hospital Problems    Diagnosis  POA   • **Calculus of gallbladder with acute cholecystitis without obstruction [K80.00]  Yes   • Leukocytosis [D72.829]  Yes   • HTN (hypertension) [I10]  Yes   • Adrenal nodule (CMS/HCC) [E27.9]  Yes      Resolved Hospital Problems   No resolved problems to display.          Hospital Course:  Yaritza Barney is a 59 y.o. female admitted with abdominal pain found to have acute calculous cholecystitis. She underwent laparoscopic cholecystectomy w/ Dr. MCKEE  with uneventful post operative course. D/W Dr. MCKEE prior to d/c, felt suitable for d/c without abx. She will follow up with him in 1 week as directed.      Discharge Follow Up Recommendations for labs/diagnostics:   Dr. MCKEE in 1 week.    Day of Discharge     HPI: Up in bed talking on phone.     Review of Systems  Gen- No fevers, chills  CV- No chest pain, palpitations  Resp- No cough, dyspnea  GI- No N/V/D, abd pain    Otherwise ROS is negative except as mentioned in the HPI.    Vital Signs:   Temp:  [97.5 °F (36.4 °C)-98.4 °F (36.9 °C)] 97.5 °F (36.4 °C)  Heart Rate:  [65-71] 65  Resp:  [16-18] 16  BP: (142-165)/(78-93) 165/93     Physical Exam:  Constitutional: No acute distress, awake, alert  HENT: NCAT, mucous membranes moist  Respiratory: Clear to auscultation bilaterally, respiratory effort normal   Cardiovascular: RRR, no murmurs, rubs, or gallops, palpable pedal pulses bilaterally  Gastrointestinal: Positive bowel sounds, soft,  nontender, nondistended, lap sites c/d/i  Musculoskeletal: No bilateral ankle edema  Psychiatric: Appropriate affect, cooperative  Neurologic: Oriented x 3, strength symmetric in all extremities, Cranial Nerves grossly intact to confrontation, speech clear  Skin: No rashes    Pertinent  and/or Most Recent Results     Results from last 7 days   Lab Units 12/08/19  0635 12/07/19  0452 12/07/19 0409 12/06/19 0400 12/05/19 2122   WBC 10*3/mm3 10.85* 13.47*  --  12.71* 17.47*   HEMOGLOBIN g/dL 12.8 12.8  --  12.5 14.7   HEMATOCRIT % 41.4 40.8  --  39.7 45.1   PLATELETS 10*3/mm3 365 362  --  339 408   SODIUM mmol/L  --   --  140 137 138   POTASSIUM mmol/L  --   --  4.7  4.7 3.1* 3.5   CHLORIDE mmol/L  --   --  102 97* 95*   CO2 mmol/L  --   --  28.0 29.0 29.0   BUN mg/dL  --   --  11 11 12   CREATININE mg/dL  --   --  0.70 0.67 0.79   GLUCOSE mg/dL  --   --  108* 101* 131*   CALCIUM mg/dL  --   --  9.6 8.6 9.7     Results from last 7 days   Lab Units 12/07/19  0409 12/06/19 0400 12/05/19 2122   BILIRUBIN mg/dL 0.3  --  0.4   ALK PHOS U/L 88  --  91   ALT (SGPT) U/L 56*  --  14   AST (SGOT) U/L 53*  --  16   PROTIME Seconds  --  13.3  --    INR   --  1.07  --    APTT seconds  --  31.4  --            Invalid input(s): TG, LDLCALC, LDLREALC  Results from last 7 days   Lab Units 12/06/19 0400 12/05/19 2122   HEMOGLOBIN A1C % 5.60  --    TROPONIN T ng/mL  --  <0.010   PROCALCITONIN ng/mL  --  0.10   LACTATE mmol/L  --  1.2       Brief Urine Lab Results  (Last result in the past 365 days)      Color   Clarity   Blood   Leuk Est   Nitrite   Protein   CREAT   Urine HCG        12/05/19 2113 Yellow Clear Negative Negative Negative 30 mg/dL (1+)               Microbiology Results Abnormal     Procedure Component Value - Date/Time    Blood Culture - Blood, Arm, Right [[id number]] Collected:  12/05/19 2140    Lab Status:  Preliminary result Specimen:  Blood from Arm, Right Updated:  12/07/19 2215     Blood Culture No growth at  2 days    Blood Culture - Blood, Arm, Left [[id number]] Collected:  12/05/19 2130    Lab Status:  Preliminary result Specimen:  Blood from Arm, Left Updated:  12/07/19 2215     Blood Culture No growth at 2 days          Imaging Results (All)     Procedure Component Value Units Date/Time    XR Chest 1 View [[id number]] Collected:  12/06/19 0901     Updated:  12/06/19 1304    Narrative:       EXAMINATION: XR CHEST 1 VW-      INDICATION: Baseline; preop; K80.00-Calculus of gallbladder with acute  cholecystitis without obstruction; R10.9-Unspecified abdominal pain;  D72.829-Elevated white blood cell count, unspecified.      COMPARISON: 12/06/2019.     FINDINGS: The cardiac silhouette is normal there is no pulmonary  inflammatory process, mass or effusion.           Impression:       No active disease.     This report was finalized on 12/6/2019 1:01 PM by Dr. Nash Lan MD.       CT Abdomen Pelvis With Contrast [[id number]] Collected:  12/06/19 0630     Updated:  12/06/19 0632    Narrative:       CT Abdomen Pelvis W    INDICATION:   59-year-old female with right upper quadrant abdominal pain and nausea for several days.    TECHNIQUE:   CT of the abdomen and pelvis with IV contrast. Coronal and sagittal reconstructions were obtained.  Radiation dose reduction techniques included automated exposure control or exposure modulation based on body size. Count of known CT and cardiac nuc med  studies performed in previous 12 months: 0.     COMPARISON:   None available.    FINDINGS:  Visualized lung bases are unremarkable.    Abdomen:   Diffuse fatty infiltration throughout the liver. The spleen and pancreas are normal. Gallbladder distention with diffuse gallbladder wall thickening and moderate pericholecystic inflammatory stranding. Findings appear consistent with acute cholecystitis.  Indeterminate 2.4 cm left adrenal nodule. Right adrenal gland is normal. The kidneys are normal. Abdominal aorta normal in course and caliber.  Small bowel is unremarkable without obstruction. Normal appendix. Inflammatory changes of the hepatic flexure  of the colon and proximal transverse colon, likely secondary to adjacent inflammation of the gallbladder. Uncomplicated sigmoid colonic diverticulosis. No free fluid or free air.    Pelvis:   The urinary bladder is unremarkable.  Hysterectomy. Small amount of free pelvic fluid.    No acute osseous abnormality.        Impression:         1. Acute cholecystitis with suspected secondary inflammation of the adjacent hepatic flexure of the colon and proximal transverse colon. Small amount of free fluid in the pelvis.  2. Hepatic steatosis.  3. Indeterminate 2.4 cm left adrenal nodule. Further characterization with nonemergent multiphase contrast-enhanced MRI of the abdomen is recommended.  4. Normal appendix.  5. Uncomplicated sigmoid colonic diverticulosis.          Signer Name: Adriel Muro MD   Signed: 12/6/2019 6:30 AM   Workstation Name: [id number]    [facility] Gallbladder [[id number]] Collected:  12/05/19 2059     Updated:  12/05/19 2102    Narrative:       US Abdomen Ltd    INDICATION:   One week history of epigastric pain with nausea and vomiting.    COMPARISON:   None available.    FINDINGS:  PANCREAS: Visualized portions of the pancreas are within normal limits.     LIVER: The liver demonstrates hepatic steatosis. No hepatic mass. The intrahepatic bile ducts are normal in caliber. The common duct is normal in size at the roe hepatis measuring 5 mm.    GALLBLADDER: The gallbladder demonstrates moderate thickening of the gallbladder wall. There are multiple small gallstones demonstrated within the gallbladder.    RIGHT KIDNEY: The right kidney measures 10.1 cm. Renal cortical thickness and echogenicity is normal. No hydronephrosis.    OTHER: No ascites.      Impression:       1.  Cholelithiasis with moderate thickening of the gallbladder wall.  2.  Hepatic  steatosis.    Signer Name: Conrad Muro MD   Signed: 12/5/2019 8:59 PM   Workstation Name: [id number]    [facility]                          Order Current Status    Tissue Pathology Exam In process    Blood Culture - Blood, Arm, Left Preliminary result    Blood Culture - Blood, Arm, Right Preliminary result        Discharge Details        Discharge Medications      New Medications      Instructions Start Date   HYDROcodone-acetaminophen 5-325 MG per tablet  Commonly known as:  NORCO   1 tablet, Oral, Every 6 Hours PRN      ondansetron 4 MG tablet  Commonly known as:  ZOFRAN   4 mg, Oral, Every 6 Hours PRN         Continue These Medications      Instructions Start Date   hydroCHLOROthiazide 12.5 MG tablet  Commonly known as:  HYDRODIURIL   12.5 mg, Oral, Daily PRN      lisinopril 10 MG tablet  Commonly known as:  PRINIVIL,ZESTRIL   10 mg, Oral, Daily PRN             No Known Allergies      Discharge Disposition:  Home or Self Care    Diet:  Hospital:  Diet Order   Procedures   • Diet Regular; GI Soft       Activity: As directed by Dr. MCKEE      Restrictions or Other Recommendations: No heavy lifting for at least 1 week       CODE STATUS:    Code Status and Medical Interventions:   Ordered at: 12/06/19 0324     Code Status:    CPR     Medical Interventions (Level of Support Prior to Arrest):    Full       No future appointments.    Additional Instructions for the Follow-ups that You Need to Schedule     Discharge Follow-up with Specialty: Dr. MCKEE; 1 Week   As directed      Specialty:  Dr. MCKEE    Follow Up:  1 Week               Time Spent on Discharge:  32 minutes    Electronically signed by Jessica Rock II, DO, 12/08/19, 11:05 AM.

## 2019-12-08 NOTE — PLAN OF CARE
Problem: Patient Care Overview  Goal: Plan of Care Review  Flowsheets (Taken 12/8/2019 2355)  Progress: improving  Plan of Care Reviewed With: patient  Outcome Summary: Pt had BM and passed gas. Reported pain is under control, pt did not request any thing for pain at this shift. VSS. Plans for d/c to home this am.     Problem: Pain, Acute (Adult)  Goal: Acceptable Pain Control/Comfort Level  Flowsheets (Taken 12/8/2019 9283)  Acceptable Pain Control/Comfort Level: making progress toward outcome

## 2019-12-10 LAB
BACTERIA SPEC AEROBE CULT: NORMAL
BACTERIA SPEC AEROBE CULT: NORMAL

## 2019-12-12 LAB
CYTO UR: NORMAL
LAB AP CASE REPORT: NORMAL
LAB AP CLINICAL INFORMATION: NORMAL
PATH REPORT.FINAL DX SPEC: NORMAL
PATH REPORT.GROSS SPEC: NORMAL

## (undated) DEVICE — ENDOPATH XCEL BLADELESS TROCARS WITH STABILITY SLEEVES: Brand: ENDOPATH XCEL

## (undated) DEVICE — PK LAP LASR CHOLE 10

## (undated) DEVICE — 3M™ STERI-STRIP™ REINFORCED ADHESIVE SKIN CLOSURES, R1547, 1/2 IN X 4 IN (12 MM X 100 MM), 6 STRIPS/ENVELOPE: Brand: 3M™ STERI-STRIP™

## (undated) DEVICE — ADHS LIQ MASTISOL 2/3ML

## (undated) DEVICE — DRAIN JACKSON PRATT ROUND 15FR: Brand: CARDINAL HEALTH

## (undated) DEVICE — 3M(TM) TEGADERM(TM) TRANSPARENT FILM DRESSING FRAME STYLE 1622W: Brand: 3M™ TEGADERM™

## (undated) DEVICE — ENDOPATH XCEL BLUNT TIP TROCARS WITH SMOOTH SLEEVES: Brand: ENDOPATH XCEL

## (undated) DEVICE — DRSNG TELFA ILND ADH 4X6IN

## (undated) DEVICE — TUBING, SUCTION, 1/4" X 10', STRAIGHT: Brand: MEDLINE

## (undated) DEVICE — [HIGH FLOW INSUFFLATOR,  DO NOT USE IF PACKAGE IS DAMAGED,  KEEP DRY,  KEEP AWAY FROM SUNLIGHT,  PROTECT FROM HEAT AND RADIOACTIVE SOURCES.]: Brand: PNEUMOSURE

## (undated) DEVICE — COVER,LIGHT HANDLE,FLX,1/PK: Brand: MEDLINE INDUSTRIES, INC.

## (undated) DEVICE — SUT MNCRYL PLS ANTIB UD 4/0 PS2 18IN

## (undated) DEVICE — GLV SURG TRIUMPH ORTHO W/ALOE PF LTX 7.5 STRL

## (undated) DEVICE — ANTIBACTERIAL UNDYED BRAIDED (POLYGLACTIN 910), SYNTHETIC ABSORBABLE SURGICAL SUTURE: Brand: COATED VICRYL

## (undated) DEVICE — ENDOPATH XCEL UNIVERSAL TROCAR STABLILITY SLEEVES: Brand: ENDOPATH XCEL

## (undated) DEVICE — SUT SILK 2/0 SH 30IN K833H

## (undated) DEVICE — JACKSON-PRATT 100CC BULB RESERVOIR: Brand: CARDINAL HEALTH

## (undated) DEVICE — ENDOCUT SCISSOR TIP, DISPOSABLE: Brand: RENEW